# Patient Record
Sex: FEMALE | Race: BLACK OR AFRICAN AMERICAN | NOT HISPANIC OR LATINO | URBAN - METROPOLITAN AREA
[De-identification: names, ages, dates, MRNs, and addresses within clinical notes are randomized per-mention and may not be internally consistent; named-entity substitution may affect disease eponyms.]

---

## 2023-06-20 ENCOUNTER — TRANSFERRED RECORDS (OUTPATIENT)
Dept: HEALTH INFORMATION MANAGEMENT | Facility: CLINIC | Age: 23
End: 2023-06-20
Payer: COMMERCIAL

## 2023-06-22 ENCOUNTER — TRANSCRIBE ORDERS (OUTPATIENT)
Dept: OTHER | Age: 23
End: 2023-06-22

## 2023-06-22 ENCOUNTER — TELEPHONE (OUTPATIENT)
Dept: OPHTHALMOLOGY | Facility: CLINIC | Age: 23
End: 2023-06-22
Payer: COMMERCIAL

## 2023-06-22 DIAGNOSIS — H54.7 UNSPECIFIED VISUAL LOSS: Primary | ICD-10-CM

## 2023-06-22 NOTE — TELEPHONE ENCOUNTER
[facility] Call Center    Phone Message    May a detailed message be left on voicemail: yes     Reason for Call: Other: Patient states someone called her and she is returning our call. Please call patient back to see if we can scheudle her with Dr Ospina in neuro eye per referral. Diagnosis not in guideline for neuro eye therefore writer unable to schedule. Thank you.     Action Taken: Message routed to:  [facility] Eye    Travel Screening: Not Applicable

## 2023-06-22 NOTE — TELEPHONE ENCOUNTER
Ok for first available with Dr. Madrid    Note to patient communicator to assist in scheduling    Kendall Mark RN 5:03 PM 06/22/23

## 2023-06-22 NOTE — TELEPHONE ENCOUNTER
[facility] Call Center    Phone Message    May a detailed message be left on voicemail: yes     Reason for Call: Appointment Intake    Referring Provider Name: Dr. Angel Cooper at [facility]  Diagnosis and/or Symptoms: Unexplained vision loss.    Referral/recs are being sent. Referring office did not have pt with them or on the phone. Rome states that pt was last seen in March and since then has had unexplained vision loss. Protocols would instruct writer to have pt speak with triage nurse, but pt was not present. Referring to Dr. Ospina. Please call Rome at [facility] at Ph [phone] to determine scheduling needs. Thank you.    Action Taken: Message routed to:  [facility] EYE    Travel Screening: Not Applicable

## 2023-06-23 ENCOUNTER — TELEPHONE (OUTPATIENT)
Dept: OPHTHALMOLOGY | Facility: CLINIC | Age: 23
End: 2023-06-23
Payer: COMMERCIAL

## 2023-06-23 NOTE — TELEPHONE ENCOUNTER
Called and LAVERN Mukherjee can make an appointment with Dr. Ray for next available - ok per [facility]    Margie Bhat Communication Facilitator on 6/23/2023 at 9:56 AM

## 2023-08-02 NOTE — PROGRESS NOTES
1. Subjective Visual Disturbance    Patient describes blurry distance vision at distance and double vision at near for the last few months. Visual acuity is reduced in each eye (20/60 right eye; 20/40 left eye). Comprehensive eye exam today unremarkable; no structural correlate to explain her subjective visual disturbance nor her mildly decreased visual acuity in both eyes.  OCT unremarkable including normal retinal nerve fiber layer compared to age-matched controls. Pupillary function normal. No indication of an occult optic neuropathy nor maculopathy.    Patient has seen neuro-ophthalmology in the past, around 2019, but she cannot recall the reason she was seen, what the examination found, or what the treatment plan was. She is uncertain whether she has previous had an MRI of her head. Given it is uncertain what previous workup may have been completed and no abnormal findings on dilated exam today, recommend obtaining head MRI to evaluate for possible neurologic causes of her visual disturbance. Will contact patient regarding results and follow-up based on imaging results.    Plan:  Obtain MRI brain and orbits with and without contrast    Follow-up with me in 4 months    History of Present Illness   Yaz Mejia is a pleasant 23 year old Black or  female who presents to my neuro-ophthalmology clinic today having been referred by Dr. Cooper for decreased visual acuity in both eyes    HPI:  Yaz Mejia is a 23 year old female with a past medical history of unspecified vision loss, ocular hypertension, refractive amblyopia, and intermittent exotropia at near. Per chart review, patient was seen by Dr. Cooper with [facility] on 06/20/23 for evaluation of blurry vision. At that time, her visual acuity was 20/60 in the right eye and 20/70 in the left eye; her intraocular pressures were borderline elevated. Visual field at that time demonstrated generalized depressions, although  was unreliable with high false negatives. OCT of the disc and macula were within normal limits. Per the patient, she has seen neuro-ophthalmology in the past.    Patient states symptoms started a few months ago and presented with symptoms of blurry vision in the distance and double vision at near. She says these symptoms are present all of the time and she notices it more prominently with her glasses off. She also describes the feeling that she is only able to focus on one this at a time, while the remaining items in her vision becomes blurry. These symptoms do not improve with blinking. She also describes symptoms of pressure around her eyes around once a week. She also describes symptoms of headaches that began shortly after her visual symptoms. She describes the headaches as in the back of her head, lasting around 2-15 minutes, a 5/10 in intensity. They typically resolve on their own, but she occasionally uses OTC pain relievers, which do not significantly improve symptoms. She does note some increased light sensitivity since these symptoms began. She also notes some mild eye burning, which does not improve with blinking.    She denies any symptoms of recent infections, fevers, weight loss/gain, dizziness, or muscle/joint aches    Patient states she was seen by Dr. Britni Dai in the past at [facility].  Patient was seen around 2019 and not since. She thinks this is when she was first discovered to need glasses. Patient doesn't recall much about her visit with Dr. Dai.     Independent historians:  Patient    Data   Review of outside testing:  CT Soft Tissue Neck (07/22/23):  FINDINGS:   Imaged Brain Parenchyma: Unremarkable.    Paranasal Sinuses and Mastoid Air Cells: Clear.   Pharynx/Larynx/Trachea: Tonsils are unremarkable. Epiglottis is normal. Airway is patent. No prevertebral soft tissue swelling.   Salivary glands: Unremarkable.    Thyroid gland: Unremarkable.  No significant nodules.   Lymph nodes: No  lymphadenopathy.   Vessels: Unremarkable for age.    Bones: Straightening of the cervical lordosis. No acute findings.   Misc: No inflammation, mass or fluid collection.    Lung apices: Unremarkable.       CT head without contrast (09/2019)  IMPRESSION:   1. Normal noncontrast head CT    Labs:  Outside ED (07/22/23):  Complete Blood Count:  CBC   Recent Labs   07/22/23 1928   WBC 10.6   RBC 4.09   HGB 11.3 L   HCT 35.3   MCV 86   MCH 27.6   MCHC 32.0   RDW 13.9    H   MPV 10.1     BASIC METABOLIC PANEL:  Recent Labs   07/22/23 1928   SODIUM 139   POTASSIUM 4.0   CHLORIDE 104   IC4VUUVW 25   ANIONGAP 10   BUN 16   CREATININE 0.96 H   GLUCOSE 92   CALCIUM 9.6     eGFR:  Recent Labs   07/22/23 1928   EGFRCHEM 85 L     Review of outside clinical notes:  Dr. Cooper - Ophthalmology (06/20/23):      Internal Medicine - Dr. Cedillo (07/21/23):  Assessment / Plan   (R59.0) Enlarged lymph node in neck   (J02.9) Sore throat  Comment: Patient with sore throat x1-2 weeks. Presented to ED where she tested negative for strep (PCR and culture) at that time. Now with swollen left cervical lymph node and continued sore throat and pain. Intermittent hot/cold, night sweats. Story most consistent with acute pharyngitis vs mono without alarming/red flag signs/symptoms. Will start with repeat labs as below. Will treat with antibiotics if strep returns positive. Otherwise symptom management as below and return to clinic if no improvement.   Plan:   - Check Heterophile, STREP A PCR test today  - Will get back to her with results   - Recommended ibuprofen 400-600 mg TID for the next 5-7 days  - Return to clinic if no improvement in symptoms after 1 week    (F33.1, F41.9) Recurrent moderate major depressive disorder with anxiety (HC)  Comment: Follows with psychiatrist monthly. Recently stopped Lexapro and placed on vilazodone 30 mg daily through psychiatry (they are prescribing). Also on gabapentin 100 mg TID PRN. Doing well from  mood stand point.   Plan:   - Continue follow up with psychiatry as scheduled     (J45.20) Mild intermittent asthma without complication  Comment: Has long standing history of asthma, uses Ventolin inhaler PRN. Has not needed to use in the last several months. No recent flares or ED visits for asthma.  Plan:   - Refilled Ventolin HFA 90 mcg/actuation inhaler    (R03.0) Elevated blood pressure reading in office without diagnosis of hypertension  Comment: Blood pressure elevated to 138/91 in clinic today. Intermittently elevated in the past per chart/vital review. Could be related to pain. Patient does not hold hypertension diagnosis.   Plan:   - Recommended checking BP at pharmacy several times over the next several months  - RTC if BP consistently elevated above 140/90  - Continue to monitor BP at subsequent visits     Emergency Department (07/22/23):  IMPRESSION AND PLAN:  This patient presents to the emergency department complaining of left-sided neck pain and difficulty swallowing for couple weeks. She is also noted some pain in her left ear and brief, intermittent paresthesias in her left hand. I considered a broad differential diagnosis given her presentation. She has already been seen twice for this and has tested negative for strep and mono. Her lab work today is notable for thrombocytosis and chronic anemia. CT scan of her neck shows no evidence for inflammation, mass, or fluid collection. I do not have a clear explanation for her symptoms, but I do not believe she requires any further emergent or inpatient work-up at this time. I discharged her with a prescription for ibuprofen and instructed her to follow-up as recommended. She should return to the emergency department for any new or worsening symptoms.     Past Medical History   History reviewed. No pertinent past medical history.      Medications   Patient has a current medication list which includes the following prescription(s): gabapentin, ventolin  hfa, vilazodone, and vilazodone..    Family History   Patient's family history includes Glasses (<7 y/o) in her sister; Glaucoma in her mother.     Social History   Patient  reports that she has never smoked. She has never used smokeless tobacco.     She has only smoked marijuana, although not currently. She has never used tobacco. She drinks occasionally.    Physical Exam    Exam:  Visual acuity today is 20/60 in the right eye, 20/40 in the left eye. Color vision is 11/11 in the right eye, 11/11 in the left eye. Intraocular pressure is 16 in the right eye and 17 in the left eye.  Pupils are equal, round and reactive. Anterior segment exam today normal. Fundus exam today normal. Full motility. No nystagmus in all gaze positions. Normal alignment in primary gaze (orthophoric on alternate cover testing at distance and near in primary gaze).    Tests ordered and interpreted today:  Ocular Testing Today:  OCT-NFL:  Right eye: normal retinal nerve fiber layer compared to age-matched controls   Left eye: normal retinal nerve fiber layer compared to age-matched controls     Automated kinetic visual fields showed   In the right eye - mild constriction- overlapping isopters indicative of inconsistent responses  In the left eye - mild constriction- overlapping isopters indicative of inconsistent responses         Complete documentation of historical and exam elements from today's encounter can be found in the full encounter summary report (not reduplicated in this progress note).  I personally re-obtained the chief complaint(s) and history of present illness.  I confirmed and edited as necessary the review of systems, past medical/surgical history, family history, social history, and examination findings as documented by others; and I examined the patient myself.  I personally reviewed the relevant tests, images, and reports as documented above.  I formulated and edited as necessary the assessment and plan and discussed the  findings and management plan with the patient and family     A medical student was involved in the care of the patient. I was present with the medical student who participated in the service and in the documentation of the note. I have  verified the history and personally performed the physical exam and medical decision making. I extensively reviewed and edited when necessary the assessment and plan. I agree with the assessment and plan of care as documented in the note    Martinez Ospina MD    Precharting:  Wan Bro, MS4  [facility]

## 2023-08-08 ENCOUNTER — OFFICE VISIT (OUTPATIENT)
Dept: OPHTHALMOLOGY | Facility: CLINIC | Age: 23
End: 2023-08-08
Attending: OPHTHALMOLOGY
Payer: COMMERCIAL

## 2023-08-08 DIAGNOSIS — H54.7 UNSPECIFIED VISUAL LOSS: ICD-10-CM

## 2023-08-08 DIAGNOSIS — H53.10 SUBJECTIVE VISUAL DISTURBANCE: ICD-10-CM

## 2023-08-08 DIAGNOSIS — H53.40 VISUAL FIELD DEFECT: Primary | ICD-10-CM

## 2023-08-08 PROCEDURE — 92004 COMPRE OPH EXAM NEW PT 1/>: CPT | Performed by: OPHTHALMOLOGY

## 2023-08-08 PROCEDURE — 92083 EXTENDED VISUAL FIELD XM: CPT | Performed by: OPHTHALMOLOGY

## 2023-08-08 PROCEDURE — G0463 HOSPITAL OUTPT CLINIC VISIT: HCPCS | Performed by: OPHTHALMOLOGY

## 2023-08-08 PROCEDURE — 92133 CPTRZD OPH DX IMG PST SGM ON: CPT | Performed by: OPHTHALMOLOGY

## 2023-08-08 RX ORDER — ALBUTEROL SULFATE 90 UG/1
AEROSOL, METERED RESPIRATORY (INHALATION)
COMMUNITY
Start: 2023-07-21

## 2023-08-08 RX ORDER — VILAZODONE HYDROCHLORIDE 10 MG/1
10 TABLET ORAL
COMMUNITY
Start: 2023-07-12

## 2023-08-08 RX ORDER — GABAPENTIN 100 MG/1
100 CAPSULE ORAL
COMMUNITY
Start: 2023-07-12

## 2023-08-08 RX ORDER — VILAZODONE HYDROCHLORIDE 20 MG/1
20 TABLET ORAL
COMMUNITY
Start: 2023-07-10

## 2023-08-08 ASSESSMENT — CONF VISUAL FIELD
OD_SUPERIOR_NASAL_RESTRICTION: 0
OS_NORMAL: 1
OD_NORMAL: 1
OS_SUPERIOR_NASAL_RESTRICTION: 0
OS_INFERIOR_TEMPORAL_RESTRICTION: 0
OD_SUPERIOR_TEMPORAL_RESTRICTION: 0
OD_INFERIOR_TEMPORAL_RESTRICTION: 0
OS_SUPERIOR_TEMPORAL_RESTRICTION: 0
OD_INFERIOR_NASAL_RESTRICTION: 0
OS_INFERIOR_NASAL_RESTRICTION: 0
METHOD: COUNTING FINGERS

## 2023-08-08 ASSESSMENT — REFRACTION_MANIFEST
OD_CYLINDER: SPHERE
OS_CYLINDER: SPHERE
OS_SPHERE: -1.25
OD_SPHERE: -1.50

## 2023-08-08 ASSESSMENT — CUP TO DISC RATIO
OS_RATIO: 0.2
OD_RATIO: 0.25

## 2023-08-08 ASSESSMENT — TONOMETRY
OD_IOP_MMHG: 16
OS_IOP_MMHG: 17
IOP_METHOD: ICARE

## 2023-08-08 ASSESSMENT — VISUAL ACUITY
CORRECTION_TYPE: GLASSES
OS_CC: 20/40
METHOD: SNELLEN - LINEAR
OD_CC+: +2
OS_CC: J2
OD_CC: 20/60

## 2023-08-08 ASSESSMENT — SLIT LAMP EXAM - LIDS
COMMENTS: NORMAL
COMMENTS: NORMAL

## 2023-08-08 ASSESSMENT — REFRACTION_WEARINGRX
SPECS_TYPE: SVL
OD_SPHERE: -1.50
OD_CYLINDER: SPHERE
OS_SPHERE: -1.25
OS_CYLINDER: SPHERE

## 2023-08-08 ASSESSMENT — EXTERNAL EXAM - RIGHT EYE: OD_EXAM: NORMAL

## 2023-08-08 ASSESSMENT — EXTERNAL EXAM - LEFT EYE: OS_EXAM: NORMAL

## 2023-08-08 NOTE — NURSING NOTE
Chief Complaint(s) and History of Present Illness(es)       Vision Changes Ou              Laterality: both eyes    Comments: Dr. Cooper requests evaluation of unspecified visual loss. VA at last exam was 20/60 RE, 20/70 LE with inconsistent HVF.  Pt has noticed gradual decrease in VA (D/N) for the past 6 months, recently decreasing more rapidly. PG are 5 mo old. Gls initially corrected VA well.   +daily HA on and off, located at the back of the head. Closing eyes helps resolve HA.  + Fhx glaucoma (mom)  Inf: pt

## 2023-08-08 NOTE — LETTER
2023    RE: Yaz Mejia  : 2000  MRN: [id number]    Dear Dr. Cooper    Thank you for referring your patient, Yaz Mejia, to my neuro-ophthalmology clinic recently.  After a thorough neuro-ophthalmic history and examination, I came to the following conclusions:     1. Subjective Visual Disturbance    Patient describes blurry distance vision at distance and double vision at near for the last few months. Visual acuity is reduced in each eye (20/60 right eye; 20/40 left eye). Comprehensive eye exam today unremarkable; no structural correlate to explain her subjective visual disturbance nor her mildly decreased visual acuity in both eyes.  OCT unremarkable including normal retinal nerve fiber layer compared to age-matched controls. Pupillary function normal. No indication of an occult optic neuropathy nor maculopathy.    Patient has seen neuro-ophthalmology in the past, around 2019, but she cannot recall the reason she was seen, what the examination found, or what the treatment plan was. She is uncertain whether she has previous had an MRI of her head. Given it is uncertain what previous workup may have been completed and no abnormal findings on dilated exam today, recommend obtaining head MRI to evaluate for possible neurologic causes of her visual disturbance. Will contact patient regarding results and follow-up based on imaging results.    Plan:  Obtain MRI brain and orbits with and without contrast    Follow-up with me in 4 months    Again, thank you for trusting me with the care of your patient.  For further exam details, please feel free to contact our office for additional records.  If you wish to contact me regarding this patient please email me at [email or web address] or give my clinic a call to arrange a phone conversation.    Sincerely,  Martinez Ospina MD  , Neuro-Ophthalmology and Adult Strabismus Surgery  The Ke[facility] Villalobos [facility] in  Neuro-Ophthalmology  Department of Ophthalmology and Visual Neurosciences  [facility]

## 2023-08-21 ENCOUNTER — ANCILLARY PROCEDURE (OUTPATIENT)
Dept: MRI IMAGING | Facility: CLINIC | Age: 23
End: 2023-08-21
Attending: OPHTHALMOLOGY
Payer: COMMERCIAL

## 2023-08-21 DIAGNOSIS — H53.10 SUBJECTIVE VISUAL DISTURBANCE: ICD-10-CM

## 2023-08-21 PROCEDURE — 70553 MRI BRAIN STEM W/O & W/DYE: CPT | Mod: GC | Performed by: RADIOLOGY

## 2023-08-21 PROCEDURE — A9585 GADOBUTROL INJECTION: HCPCS | Mod: JZ | Performed by: RADIOLOGY

## 2023-08-21 RX ORDER — GADOBUTROL 604.72 MG/ML
7.5 INJECTION INTRAVENOUS ONCE
Status: COMPLETED | OUTPATIENT
Start: 2023-08-21 | End: 2023-08-21

## 2023-08-21 RX ADMIN — GADOBUTROL 6.5 ML: 604.72 INJECTION INTRAVENOUS at 07:31

## 2023-08-22 ENCOUNTER — TELEPHONE (OUTPATIENT)
Dept: OPHTHALMOLOGY | Facility: CLINIC | Age: 23
End: 2023-08-22
Payer: COMMERCIAL

## 2023-08-22 NOTE — TELEPHONE ENCOUNTER
Called patient and LVM to let her know that her MRI came back normal, which is reassuring that she does not have a brain tumor or stroke.  Recommended that she keep her follow up appointment with Dr. Ospina as scheduled.  Provided direct call back number for questions/concerns.    Jay Jay Taylor on 8/22/2023 at 4:04 PM

## 2023-08-22 NOTE — TELEPHONE ENCOUNTER
----- Message from Martinez Ospina MD sent at 8/22/2023 12:56 PM CDT -----  Regarding: MRI results  Jay Jay,    Please call patient and let her know that her MRI came back normal which is very reassuring. I can't explain her vision troubles but it is reassuring that she does not have a brain tumor or stroke or any other serious inctracranial problem affecting vision.    I recommend she keep her December appointment with me as scheduled.    Thank you. - Martinez

## 2023-12-08 ENCOUNTER — OFFICE VISIT (OUTPATIENT)
Dept: OPHTHALMOLOGY | Facility: CLINIC | Age: 23
End: 2023-12-08
Attending: OPHTHALMOLOGY
Payer: COMMERCIAL

## 2023-12-08 DIAGNOSIS — H53.40 VISUAL FIELD DEFECT: ICD-10-CM

## 2023-12-08 DIAGNOSIS — H53.10 SUBJECTIVE VISUAL DISTURBANCE: Primary | ICD-10-CM

## 2023-12-08 PROCEDURE — 92133 CPTRZD OPH DX IMG PST SGM ON: CPT | Performed by: OPHTHALMOLOGY

## 2023-12-08 PROCEDURE — G0463 HOSPITAL OUTPT CLINIC VISIT: HCPCS | Performed by: OPHTHALMOLOGY

## 2023-12-08 PROCEDURE — 92083 EXTENDED VISUAL FIELD XM: CPT | Performed by: OPHTHALMOLOGY

## 2023-12-08 PROCEDURE — 99213 OFFICE O/P EST LOW 20 MIN: CPT | Mod: GC | Performed by: OPHTHALMOLOGY

## 2023-12-08 ASSESSMENT — REFRACTION_WEARINGRX
OD_CYLINDER: SPHERE
OS_CYLINDER: SPHERE
OD_SPHERE: -1.25
OS_SPHERE: -1.50

## 2023-12-08 ASSESSMENT — CONF VISUAL FIELD
OD_INFERIOR_TEMPORAL_RESTRICTION: 0
OS_SUPERIOR_NASAL_RESTRICTION: 0
OD_NORMAL: 1
OD_INFERIOR_NASAL_RESTRICTION: 0
OS_INFERIOR_NASAL_RESTRICTION: 0
OS_NORMAL: 1
OD_SUPERIOR_NASAL_RESTRICTION: 0
OD_SUPERIOR_TEMPORAL_RESTRICTION: 0
OS_SUPERIOR_TEMPORAL_RESTRICTION: 0
OS_INFERIOR_TEMPORAL_RESTRICTION: 0

## 2023-12-08 ASSESSMENT — VISUAL ACUITY
OS_CC: 20/25-2
METHOD: SNELLEN - LINEAR
OD_CC: 20/25-3
OD_CC: J1-2
OS_CC: J1
CORRECTION_TYPE: GLASSES

## 2023-12-08 ASSESSMENT — REFRACTION_MANIFEST
OD_SPHERE: -1.25
OS_CYLINDER: +0.50
OD_AXIS: 045
OD_CYLINDER: +0.25
OS_SPHERE: -1.00
OS_AXIS: 160

## 2023-12-08 ASSESSMENT — TONOMETRY
OS_IOP_MMHG: 20
IOP_METHOD: ICARE
OD_IOP_MMHG: 22

## 2023-12-08 ASSESSMENT — CUP TO DISC RATIO
OD_RATIO: 0.25
OS_RATIO: 0.2

## 2023-12-08 ASSESSMENT — EXTERNAL EXAM - LEFT EYE: OS_EXAM: NORMAL

## 2023-12-08 ASSESSMENT — SLIT LAMP EXAM - LIDS
COMMENTS: NORMAL
COMMENTS: NORMAL

## 2023-12-08 ASSESSMENT — EXTERNAL EXAM - RIGHT EYE: OD_EXAM: NORMAL

## 2023-12-08 NOTE — PROGRESS NOTES
1. Subjective Visual Disturbance    Patient presented 4 months ago with complaints of blurred vision and double vision at near for the past few months.  At that time vision was 20/60 and 20/40.  No abnormal structural features found on exam or imaging.  Normal alignment and eye muscle movement.      Repeat MRI brain had motion artifact of the eyes, but otherwise normal.  Today her vision has improved to 20/25 and 20/20 with updated mild myopic glasses prescription. Pentacam with no signficant astigmatism and OCT RNFL still wnl for age each eye. No signs of ocular or intracranial cause for decreased vision.     Beyond a small amount of myopic astigmatism I can find no other etiology for her mild subjective blurring.  Importantly there is no indication of a serious optic nerve, central nervous system, or retinal cause for her visual blurring. Her automated kinetic visual fields today showed moderate constriction with significant crossing isopters indicative of inconsistencies in responses not seen in organic vision loss.  Reassured patient today.    Plan:  No follow-up required with neuro-ophthalmology  Recommend patient resume routine care with Dr. Cooper    Follow-up none     Historical data including initial visit HPI    Yaz Mejia is a pleasant 23 year old Black or  female who presents to my neuro-ophthalmology clinic today having been referred by Dr. Cooper for decreased visual acuity in both eyes    HPI:  Yaz Mejia is a 23 year old female with a past medical history of unspecified vision loss, ocular hypertension, refractive amblyopia, and intermittent exotropia at near. Per chart review, patient was seen by Dr. Cooper with [facility] on 06/20/23 for evaluation of blurry vision. At that time, her visual acuity was 20/60 in the right eye and 20/70 in the left eye; her intraocular pressures were borderline elevated. Visual field at that time demonstrated generalized  depressions, although was unreliable with high false negatives. OCT of the disc and macula were within normal limits. Per the patient, she has seen neuro-ophthalmology in the past.    Patient states symptoms started a few months ago and presented with symptoms of blurry vision in the distance and double vision at near. She says these symptoms are present all of the time and she notices it more prominently with her glasses off. She also describes the feeling that she is only able to focus on one this at a time, while the remaining items in her vision becomes blurry. These symptoms do not improve with blinking. She also describes symptoms of pressure around her eyes around once a week. She also describes symptoms of headaches that began shortly after her visual symptoms. She describes the headaches as in the back of her head, lasting around 2-15 minutes, a 5/10 in intensity. They typically resolve on their own, but she occasionally uses OTC pain relievers, which do not significantly improve symptoms. She does note some increased light sensitivity since these symptoms began. She also notes some mild eye burning, which does not improve with blinking.    She denies any symptoms of recent infections, fevers, weight loss/gain, dizziness, or muscle/joint aches    Patient states she was seen by Dr. Britni Dai in the past at [facility].  Patient was seen around 2019 and not since. She thinks this is when she was first discovered to need glasses. Patient doesn't recall much about her visit with Dr. Dai.     Independent historians:  Patient    Review of outside testing:  CT Soft Tissue Neck (07/22/23):  FINDINGS:   Imaged Brain Parenchyma: Unremarkable.    Paranasal Sinuses and Mastoid Air Cells: Clear.   Pharynx/Larynx/Trachea: Tonsils are unremarkable. Epiglottis is normal. Airway is patent. No prevertebral soft tissue swelling.   Salivary glands: Unremarkable.    Thyroid gland: Unremarkable.  No significant nodules.    Lymph nodes: No lymphadenopathy.   Vessels: Unremarkable for age.    Bones: Straightening of the cervical lordosis. No acute findings.   Misc: No inflammation, mass or fluid collection.    Lung apices: Unremarkable.       CT head without contrast (09/2019)  IMPRESSION:   1. Normal noncontrast head CT      Review of outside clinical notes:  Dr. Cooper - Ophthalmology (06/20/23):        Past medical history   History reviewed. No pertinent past medical history.    Medications  Patient has a current medication list which includes the following prescription(s): gabapentin, ventolin hfa, vilazodone, and vilazodone..    Family history and social history   Patient's family history includes Glasses (<7 y/o) in her sister; Glaucoma in her mother.     Patient  reports that she has never smoked. She has never used smokeless tobacco.     She has only smoked marijuana, although not currently. She has never used tobacco. She drinks occasionally.    Interim data and history since last visit (8/8/23)  Patient presents today stating that her current glasses aren't helping very much for her astigmatism. She is having constant blurry vision each eye. Has occasional intermittent headaches. No eye pain today.     Interval Imaging  MRI brain and orbit with and without contrast (8/21/23)  Impression:    1. Imaging degraded by eye motion artifact. No definite abnormality  identified to explain bilateral vision loss. Lenses of both eyes also  not well-visualized, presumed artifactual.  2. Regarding the remainder of the brain, no significant abnormalities  are demonstrated.     I reviewed these MRI images today and agree with the interpretation.     Exam:  Visual acuity corrects to 20/25 and 20/20, normal color vision OU.  Normal pupil exam without rAPD OU.  Anterior segment and posterior segment exam is stable and unremarkable OU.      Tests ordered and interpreted today:  Functional VF: moderate constriction in both eyes with crossing  isopters indicating a significant component of non-organic field loss.     Pentacam: no significant irregular astigmatism in either eye    OCT RNFL: stable, wnl for age each eye.        Carmelo Medina MD   Fellow, Neuro-Ophthalmology     Dat Steen MD  PGY-3 Ophthalmology    25 minutes were spent on the date of the encounter by me doing chart review, history and exam, documentation, and further activities as noted above    Complete documentation of historical and exam elements from today's encounter can be found in the full encounter summary report (not reduplicated in this progress note).  I personally obtained the chief complaint(s) and history of present illness.  I confirmed and edited as necessary the review of systems, past medical/surgical history, family history, social history, and examination findings as documented by others; and I examined the patient myself.  I personally reviewed the relevant tests, images, and reports as documented above.  I formulated and edited as necessary the assessment and plan and discussed the findings and management plan with the patient and family.  I personally reviewed the ophthalmic test(s) associated with this encounter, agree with the interpretation(s) as documented by the resident/fellow, and have edited the corresponding report(s) as necessary.     Martinez Ospina MD